# Patient Record
Sex: FEMALE | Race: WHITE | Employment: FULL TIME | URBAN - METROPOLITAN AREA
[De-identification: names, ages, dates, MRNs, and addresses within clinical notes are randomized per-mention and may not be internally consistent; named-entity substitution may affect disease eponyms.]

---
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
## 2024-01-25 ENCOUNTER — LAB ENCOUNTER (OUTPATIENT)
Dept: LAB | Age: 24
End: 2024-01-25
Attending: NURSE PRACTITIONER
Payer: COMMERCIAL

## 2024-01-25 ENCOUNTER — OFFICE VISIT (OUTPATIENT)
Dept: FAMILY MEDICINE CLINIC | Facility: CLINIC | Age: 24
End: 2024-01-25
Payer: COMMERCIAL

## 2024-01-25 VITALS
TEMPERATURE: 97 F | HEART RATE: 68 BPM | WEIGHT: 164 LBS | BODY MASS INDEX: 26.05 KG/M2 | OXYGEN SATURATION: 98 % | DIASTOLIC BLOOD PRESSURE: 76 MMHG | HEIGHT: 66.5 IN | SYSTOLIC BLOOD PRESSURE: 112 MMHG

## 2024-01-25 DIAGNOSIS — Z83.3 FAMILY HISTORY OF DIABETES MELLITUS: ICD-10-CM

## 2024-01-25 DIAGNOSIS — E66.3 OVERWEIGHT (BMI 25.0-29.9): ICD-10-CM

## 2024-01-25 DIAGNOSIS — M54.9 UPPER BACK PAIN: ICD-10-CM

## 2024-01-25 DIAGNOSIS — M54.2 NECK PAIN: ICD-10-CM

## 2024-01-25 DIAGNOSIS — Z82.69 FAMILY HISTORY OF SCOLIOSIS: ICD-10-CM

## 2024-01-25 DIAGNOSIS — Z76.89 ENCOUNTER TO ESTABLISH CARE: Primary | ICD-10-CM

## 2024-01-25 LAB
ALBUMIN SERPL-MCNC: 4.2 G/DL (ref 3.4–5)
ALBUMIN/GLOB SERPL: 1.1 {RATIO} (ref 1–2)
ALP LIVER SERPL-CCNC: 62 U/L
ANION GAP SERPL CALC-SCNC: 3 MMOL/L (ref 0–18)
AST SERPL-CCNC: 19 U/L (ref 15–37)
BASOPHILS # BLD AUTO: 0.05 X10(3) UL (ref 0–0.2)
BASOPHILS NFR BLD AUTO: 0.5 %
BILIRUB SERPL-MCNC: 1 MG/DL (ref 0.1–2)
BUN BLD-MCNC: 11 MG/DL (ref 9–23)
CALCIUM BLD-MCNC: 9.4 MG/DL (ref 8.5–10.1)
CHLORIDE SERPL-SCNC: 111 MMOL/L (ref 98–112)
CO2 SERPL-SCNC: 27 MMOL/L (ref 21–32)
CREAT BLD-MCNC: 0.87 MG/DL
EGFRCR SERPLBLD CKD-EPI 2021: 97 ML/MIN/1.73M2 (ref 60–?)
EOSINOPHIL # BLD AUTO: 0.1 X10(3) UL (ref 0–0.7)
EOSINOPHIL NFR BLD AUTO: 1.1 %
ERYTHROCYTE [DISTWIDTH] IN BLOOD BY AUTOMATED COUNT: 12.9 %
FASTING STATUS PATIENT QL REPORTED: YES
GLOBULIN PLAS-MCNC: 3.8 G/DL (ref 2.8–4.4)
GLUCOSE BLD-MCNC: 82 MG/DL (ref 70–99)
HCT VFR BLD AUTO: 44.4 %
HGB BLD-MCNC: 14.2 G/DL
IMM GRANULOCYTES # BLD AUTO: 0.02 X10(3) UL (ref 0–1)
IMM GRANULOCYTES NFR BLD: 0.2 %
LYMPHOCYTES # BLD AUTO: 2.33 X10(3) UL (ref 1–4)
LYMPHOCYTES NFR BLD AUTO: 25.4 %
MCH RBC QN AUTO: 27.9 PG (ref 26–34)
MCHC RBC AUTO-ENTMCNC: 32 G/DL (ref 31–37)
MCV RBC AUTO: 87.2 FL
MONOCYTES # BLD AUTO: 0.68 X10(3) UL (ref 0.1–1)
MONOCYTES NFR BLD AUTO: 7.4 %
NEUTROPHILS # BLD AUTO: 5.98 X10 (3) UL (ref 1.5–7.7)
NEUTROPHILS # BLD AUTO: 5.98 X10(3) UL (ref 1.5–7.7)
NEUTROPHILS NFR BLD AUTO: 65.4 %
OSMOLALITY SERPL CALC.SUM OF ELEC: 290 MOSM/KG (ref 275–295)
PLATELET # BLD AUTO: 338 10(3)UL (ref 150–450)
POTASSIUM SERPL-SCNC: 4.3 MMOL/L (ref 3.5–5.1)
PROT SERPL-MCNC: 8 G/DL (ref 6.4–8.2)
RBC # BLD AUTO: 5.09 X10(6)UL
SODIUM SERPL-SCNC: 141 MMOL/L (ref 136–145)
T4 FREE SERPL-MCNC: 1 NG/DL (ref 0.8–1.7)
TSI SER-ACNC: 0.65 MIU/ML (ref 0.36–3.74)
WBC # BLD AUTO: 9.2 X10(3) UL (ref 4–11)

## 2024-01-25 PROCEDURE — 85025 COMPLETE CBC W/AUTO DIFF WBC: CPT

## 2024-01-25 PROCEDURE — 36415 COLL VENOUS BLD VENIPUNCTURE: CPT

## 2024-01-25 PROCEDURE — 3078F DIAST BP <80 MM HG: CPT | Performed by: NURSE PRACTITIONER

## 2024-01-25 PROCEDURE — 3074F SYST BP LT 130 MM HG: CPT | Performed by: NURSE PRACTITIONER

## 2024-01-25 PROCEDURE — 84443 ASSAY THYROID STIM HORMONE: CPT

## 2024-01-25 PROCEDURE — 83036 HEMOGLOBIN GLYCOSYLATED A1C: CPT

## 2024-01-25 PROCEDURE — 3008F BODY MASS INDEX DOCD: CPT | Performed by: NURSE PRACTITIONER

## 2024-01-25 PROCEDURE — 84439 ASSAY OF FREE THYROXINE: CPT

## 2024-01-25 PROCEDURE — 99203 OFFICE O/P NEW LOW 30 MIN: CPT | Performed by: NURSE PRACTITIONER

## 2024-01-25 PROCEDURE — 80053 COMPREHEN METABOLIC PANEL: CPT

## 2024-01-25 NOTE — PROGRESS NOTES
HPI:     Patient is here to establish care and for several concerns.    Weight Concerns. Last month she weighed herself  at home last month. She was 170 lb. Used to exercise regularly but not in the last month.   Back and Upper Neck Pain. Started in high school. Feels like she always needs to pop or move her neck. Tries to sit up straight and use extra pillows. Avoids sleeping on her side. No injuries or falls that she can recall. No history of MVA. 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 Had pain prior to that though.    No current outpatient medications on file.      History reviewed. No pertinent past medical history.   History reviewed. No pertinent surgical history.   History reviewed. No pertinent family history.   Social History     Socioeconomic History    Marital status: Single   Tobacco Use    Smoking status: Never    Smokeless tobacco: Never   Vaping Use    Vaping Use: Never used         REVIEW OF SYSTEMS:   Review of Systems   Constitutional:  Positive for unexpected weight change.   Musculoskeletal:  Positive for back pain, neck pain and neck stiffness.       EXAM:   /76   Pulse 68   Temp 97.4 °F (36.3 °C)   Ht 5' 6.5\" (1.689 m)   Wt 164 lb (74.4 kg)   LMP 01/18/2024   SpO2 98%   BMI 26.07 kg/m²   Physical Exam  Constitutional:       General: She is not in acute distress.     Appearance: Normal appearance.   Cardiovascular:      Rate and Rhythm: Normal rate and regular rhythm.      Heart sounds: Normal heart sounds.   Pulmonary:      Effort: Pulmonary effort is normal.      Breath sounds: Normal breath sounds.   Musculoskeletal:      Cervical back: Crepitus present. No spasms or bony tenderness. Pain with movement present. Normal range of motion (tenderness with left lateral extension).        Back:    Neurological:      Mental Status: She is alert.         ASSESSMENT AND PLAN:   Diagnoses and all orders for this visit:    Encounter to establish care    Neck pain  -     XR CERVICAL SPINE  (4VIEWS) (CPT=72050); Future  -     XR SCOLIOSIS SPINE 2-3 VIEWS (CPT=72082); Future  -     Physical Therapy Referral - [facility]    Upper back pain  -     XR CERVICAL SPINE (4VIEWS) (CPT=72050); Future  -     XR SCOLIOSIS SPINE 2-3 VIEWS SH(CPT=72082); Future  -     Physical Therapy Referral - [facility]    Family history of diabetes mellitus  -     Hemoglobin A1C [E]; Future    Family history of scoliosis  -     XR SCOLIOSIS SPINE 2-3 VIEWS (CPT=72082); Future    Overweight (BMI 25.0-29.9)  -     CBC W Differential W Platelet [E]; Future  -     Comp Metabolic Panel (14) [E]; Future  -     TSH and Free T4 [E]; Future  -     Hemoglobin A1C [E]; Future

## 2024-01-26 ENCOUNTER — HOSPITAL ENCOUNTER (OUTPATIENT)
Dept: GENERAL RADIOLOGY | Age: 24
Discharge: HOME | End: 2024-01-26
Attending: NURSE PRACTITIONER
Payer: COMMERCIAL

## 2024-01-26 ENCOUNTER — TELEPHONE (OUTPATIENT)
Dept: FAMILY MEDICINE CLINIC | Facility: CLINIC | Age: 24
End: 2024-01-26

## 2024-01-26 DIAGNOSIS — M54.2 NECK PAIN: ICD-10-CM

## 2024-01-26 DIAGNOSIS — Z82.69 FAMILY HISTORY OF SCOLIOSIS: ICD-10-CM

## 2024-01-26 DIAGNOSIS — M54.9 UPPER BACK PAIN: ICD-10-CM

## 2024-01-26 LAB
EST. AVERAGE GLUCOSE BLD GHB EST-MCNC: 111 MG/DL (ref 68–126)
HBA1C MFR BLD: 5.5 % (ref ?–5.7)

## 2024-01-26 PROCEDURE — 72050 X-RAY EXAM NECK SPINE 4/5VWS: CPT | Performed by: NURSE PRACTITIONER

## 2024-01-26 PROCEDURE — 72082 X-RAY EXAM ENTIRE SPI 2/3 VW: CPT | Performed by: NURSE PRACTITIONER

## 2024-01-26 NOTE — TELEPHONE ENCOUNTER
----- Message from XIANG Santana sent at 1/26/2024 12:58 PM CST -----  Results reviewed. No diabetes

## 2024-01-27 ENCOUNTER — TELEPHONE (OUTPATIENT)
Dept: FAMILY MEDICINE CLINIC | Facility: CLINIC | Age: 24
End: 2024-01-27

## 2024-01-27 DIAGNOSIS — M43.9 CURVATURE OF THORACIC SPINE: Primary | ICD-10-CM

## 2024-01-27 NOTE — TELEPHONE ENCOUNTER
----- Message from XIANG Santana sent at 1/27/2024  8:07 AM CST -----  Results reviewed. Normal neck xray  Start PT

## 2024-01-27 NOTE — TELEPHONE ENCOUNTER
----- Message from XIANG Santana sent at 1/27/2024  8:08 AM CST -----  There is curvature in thoracic and lumbar spine. She is starting PT. I would also recommend seeing Dr. Tran given she is having chronic neck and back pain

## 2024-01-31 ENCOUNTER — OFFICE VISIT (OUTPATIENT)
Dept: OBGYN CLINIC | Facility: CLINIC | Age: 24
End: 2024-01-31
Payer: COMMERCIAL

## 2024-01-31 VITALS
BODY MASS INDEX: 26.11 KG/M2 | DIASTOLIC BLOOD PRESSURE: 72 MMHG | SYSTOLIC BLOOD PRESSURE: 124 MMHG | HEIGHT: 66.5 IN | HEART RATE: 70 BPM | WEIGHT: 164.38 LBS

## 2024-01-31 DIAGNOSIS — Z97.5 NEXPLANON IN PLACE: ICD-10-CM

## 2024-01-31 DIAGNOSIS — Z12.4 SCREENING FOR CERVICAL CANCER: ICD-10-CM

## 2024-01-31 DIAGNOSIS — Z01.419 WELL WOMAN EXAM WITH ROUTINE GYNECOLOGICAL EXAM: Primary | ICD-10-CM

## 2024-01-31 DIAGNOSIS — Z30.09 ENCOUNTER FOR GENERAL COUNSELING AND ADVICE ON CONTRACEPTIVE MANAGEMENT: ICD-10-CM

## 2024-01-31 PROCEDURE — 99395 PREV VISIT EST AGE 18-39: CPT | Performed by: NURSE PRACTITIONER

## 2024-01-31 PROCEDURE — 3074F SYST BP LT 130 MM HG: CPT | Performed by: NURSE PRACTITIONER

## 2024-01-31 PROCEDURE — 3008F BODY MASS INDEX DOCD: CPT | Performed by: NURSE PRACTITIONER

## 2024-01-31 PROCEDURE — 88175 CYTOPATH C/V AUTO FLUID REDO: CPT | Performed by: NURSE PRACTITIONER

## 2024-01-31 PROCEDURE — 3078F DIAST BP <80 MM HG: CPT | Performed by: NURSE PRACTITIONER

## 2024-01-31 NOTE — PROGRESS NOTES
Subjective:  Chief Complaint   Patient presents with    Physical     Annual     24 year old female  presents for ***.    Patient's last menstrual period was 2024 (exact date).  Pap Result Notes: no pap yet  Menarche: 13 (2024  8:28 AM)  Period Cycle (Days): irregular sinc enexplanon insertion (2024  8:28 AM)  Period Duration (Days): 4-6 weeks (2024  8:28 AM)  Period Flow: varies heavy to moderate (2024  8:28 AM)  Use of Birth Control (if yes, specify type): Nexplanon (2024  8:28 AM)  Pap Result Notes: no pap yet (2024  8:28 AM)    Sexually active with male partner  Declines STD testing  Pelvic Infections/STD: None    Denies family history of breast, ovarian and colon CA.    Feeling safe at home.    Most Recent Immunizations   Administered Date(s) Administered    Covid-19 Vaccine Moderna 100 mcg/0.5 ml 08/10/2021    Covid-19 Vaccine Moderna 50 Mcg/0.25 Ml 2022    Tb Intradermal Test 2023      reports that she has never smoked. She has never used smokeless tobacco.   reports current alcohol use of about 3.0 standard drinks of alcohol per week.    History reviewed. No pertinent past medical history.  History reviewed. No pertinent surgical history.    Review of Systems:  Pertinent items are noted in the HPI.    Objective:  /72   Pulse 70   Ht 66.5\"   Wt 164 lb 6 oz (74.6 kg)   LMP 2024 (Exact Date)   BMI 26.13 kg/m²    Physical Examination:  General appearance: Well dressed, well nourished in no apparent distress  Neurologic/Psychiatric: Alert and oriented to person, place and time, mood normal, affect appropriate  Head: Normocephalic without obvious deformity, atraumatic  Neck: No thyromegaly, supple, non-tender, no masses, no adenopathy  Lungs: Clear to auscultation bilaterally, no rales, wheezes or rhonchi  Breasts: Symmetric, non-tender, no masses, lesions, retraction, dimpling or discharge bilaterally, no axillary or supraclavicular  lymphadenopathy  Heart:: Regular rate and rhythm, no gallops or murmurs  Abdomen: Soft, non-tender, non-distended, no masses, no hepatosplenomegaly, no hernias, no inguinal lymphadenopathy  Pelvic:    External genitalia- Normal, Bartholin's, urethra, skeins glands normal   Vagina- No vaginal lesions, ***physiologic discharge   Cervix- No lesions, long/closed, no cervical motion tenderness   Uterus- Normal sized, non-tender, no masses   Adnexa-  Non-tender, no masses  Extremities: Non-tender, full range of motion, no clubbing, cyanosis or edema  Skin:  General inspection- no rashes, lesions or discoloration  ***Genital cultures collected  ***Pap smear done    Assessment/Plan:  ***Normal well-woman exam.  ***STD screen  ***Pap smear obtained.    Patient offered chaperone for exam, declined***    There are no diagnoses linked to this encounter.    No follow-ups on file.

## 2024-01-31 NOTE — PROGRESS NOTES
Subjective:  Chief Complaint   Patient presents with    Physical     Annual     24 year old female  presents for annual.    Patient's last menstrual period was 2024 (exact date).  Pap Result Notes: no pap yet  Menarche: 13 (2024  8:28 AM)  Period Cycle (Days): irregular sinc enexplanon insertion (2024  8:28 AM)  Period Duration (Days): 4-6 weeks (2024  8:28 AM)  Period Flow: varies heavy to moderate (2024  8:28 AM)  Use of Birth Control (if yes, specify type): Nexplanon (2024  8:28 AM)  Pap Result Notes: no pap yet (2024  8:28 AM)    Nexplanon placed 2021  Sexually active with 1 male partner  Declines STD screen    Denies family history of breast, ovarian and colon CA.    Feeling safe at home.    Most Recent Immunizations   Administered Date(s) Administered    Covid-19 Vaccine Moderna 100 mcg/0.5 ml 08/10/2021    Covid-19 Vaccine Moderna 50 Mcg/0.25 Ml 2022    Tb Intradermal Test 2023      reports that she has never smoked. She has never used smokeless tobacco.   reports current alcohol use of about 3.0 standard drinks of alcohol per week.    History reviewed. No pertinent past medical history.  History reviewed. No pertinent surgical history.    Review of Systems:  Pertinent items are noted in the HPI.    Objective:  /72   Pulse 70   Ht 66.5\"   Wt 164 lb 6 oz (74.6 kg)   LMP 2024 (Exact Date)   BMI 26.13 kg/m²    Physical Examination:  General appearance: Well dressed, well nourished in no apparent distress  Neurologic/Psychiatric: Alert and oriented to person, place and time, mood normal, affect appropriate  Head: Normocephalic without obvious deformity, atraumatic  Neck: No thyromegaly, supple, non-tender, no masses, no adenopathy  Lungs: Clear to auscultation bilaterally, no rales, wheezes or rhonchi  Breasts: Symmetric, non-tender, no masses, lesions, retraction, dimpling or discharge bilaterally, no axillary or supraclavicular  lymphadenopathy  Heart: Regular rate and rhythm, no gallops or murmurs  Abdomen: Soft, non-tender, non-distended, no masses, no hepatosplenomegaly, no hernias, no inguinal lymphadenopathy  Pelvic:    External genitalia- Normal, Bartholin's, urethra, skeins glands normal   Vagina- No vaginal lesions, physiologic discharge   Cervix- No lesions, long/closed, no cervical motion tenderness   Uterus- Normal sized, non-tender, no masses   Adnexa-  Non-tender, no masses  Extremities: + implant palpated LUE, Non-tender, full range of motion, no clubbing, cyanosis or edema  Skin:  General inspection- no rashes, lesions or discoloration  Pap smear done    Assessment/Plan:  Normal well-woman exam.  Declines STD screen  Pap smear obtained.    Patient offered chaperone for exam, declined    Diagnoses and all orders for this visit:    Well woman exam with routine gynecological exam  - self breast exam discussed and encouraged    Screening for cervical cancer  -     ThinPrep PAP with HPV Reflex Request B; Future    Nexplanon in place  - Implant palpated  - insertion 9/2021  - remove on or before 9/2024    Encounter for general counseling and advice on contraceptive management  - discussed contraceptive options including IUD, implant, pill, patch, vaginal ring, injection   - pt will consider for after removal of Nexplanon      Return in about 1 year (around 1/31/2025) for annual well woman exam or sooner if needed.

## 2024-02-05 LAB
.: NORMAL
.: NORMAL

## 2024-04-15 ENCOUNTER — TELEPHONE (OUTPATIENT)
Dept: ORTHOPEDICS CLINIC | Facility: CLINIC | Age: 24
End: 2024-04-15

## 2024-04-15 NOTE — TELEPHONE ENCOUNTER
Patient is coming in for upper back into neck pain.    XRAY'S in epic from January. Please advise if anything additional is needed

## 2024-04-22 ENCOUNTER — PATIENT MESSAGE (OUTPATIENT)
Dept: FAMILY MEDICINE CLINIC | Facility: CLINIC | Age: 24
End: 2024-04-22

## 2024-04-23 ENCOUNTER — OFFICE VISIT (OUTPATIENT)
Dept: ORTHOPEDICS CLINIC | Facility: CLINIC | Age: 24
End: 2024-04-23
Payer: COMMERCIAL

## 2024-04-23 VITALS — WEIGHT: 166 LBS | BODY MASS INDEX: 26.06 KG/M2 | HEIGHT: 67 IN

## 2024-04-23 DIAGNOSIS — M41.125 ADOLESCENT IDIOPATHIC SCOLIOSIS OF THORACOLUMBAR REGION: Primary | ICD-10-CM

## 2024-04-23 PROCEDURE — 99204 OFFICE O/P NEW MOD 45 MIN: CPT | Performed by: STUDENT IN AN ORGANIZED HEALTH CARE EDUCATION/TRAINING PROGRAM

## 2024-04-23 NOTE — H&P
[facility] - ORTHOPEDICS  [address], [address] [address]  [address] [place] [id number]  [phone]     NEW PATIENT VISIT - HISTORY AND PHYSICAL EXAMINATION     Name: Aidee Ross   MRN: [id number]  Date: 04/23/24       CC: neck pain, scoliosis    REFERRED BY: Mirta Alexander MD    HPI:   Aidee Ross is a very pleasant 24 year old female who presents today for evaluation of neck pain. The distribution of symptoms are: 100% neck pain and 0% arm pain. The symptoms began many year(s) ago without any significant injury. Since the onset, the symptoms have remained the same. The patient reports no significant numbness and no weakness.  The symptom characteristics are as follows: Patient is a 24-year-old female presenting with predominantly neck pain that has been low to moderate and constantly achy since high school.  Patient has not had formal treatment.  Patient also had a x-ray that demonstrated mild scoliosis.  No neurologic symptoms.     Prior spine surgery: none.    Bowel and bladder symptoms: absent.    The patient has not had issues with balance and/or hand dexterity problems such as changes in penmanship or the use of buttons or zippers.    Treatment up to this time has included:    Evaluation: PCP  NSAIDS: have not been helpful  Narcotic use: None  Physical therapy: None  Spinal injections: None      PMH:   History reviewed. No pertinent past medical history.    PAST SURGICAL HX:  History reviewed. No pertinent surgical history.    FAMILY HX:  Family History   Problem Relation Age of Onset    Diabetes Father     Musculo-skelatal Disorder Mother         scoliosis    Musculo-skelatal Disorder Maternal Grandmother         scoliosis    Diabetes Paternal Grandfather     Diabetes Half-Brother        ALLERGIES:  Patient has no known allergies.    MEDICATIONS:   No current outpatient medications on file.       ROS: A comprehensive 14 point review of  systems was performed and was negative aside from the aforementioned per history of present illness.    SOCIAL HX:  Social History     Tobacco Use    Smoking status: Never    Smokeless tobacco: Never   Substance Use Topics    Alcohol use: Yes     Alcohol/week: 3.0 standard drinks of alcohol     Types: 3 Glasses of wine per week     Comment: a week         PE:   Vitals:    04/23/24 1418   Weight: 166 lb (75.3 kg)   Height: 5' 7\" (1.702 m)     Estimated body mass index is 26 kg/m² as calculated from the following:    Height as of this encounter: 5' 7\" (1.702 m).    Weight as of this encounter: 166 lb (75.3 kg).    Physical Exam  Constitutional:       Appearance: Normal appearance.   HENT:      Head: Normocephalic and atraumatic.   Eyes:      Extraocular Movements: Extraocular movements intact.   Cardiovascular:      Pulses: Normal pulses. Skin warm and well perfused.  Pulmonary:      Effort: Pulmonary effort is normal. No respiratory distress.   Skin:     General: Skin is warm.   Psychiatric:         Mood and Affect: Mood normal.     Spine Exam:    Spine Exam:    Normal gait without difficulty  Able to heel, toe, tandem gait without difficulty  Level shoulders and hips in even stance    Normal C-spine ROM  Normal L-spine ROM    No tenderness to palpation of C-spine  No tenderness to palpation of T-spine  No tenderness to palpation of L-spine    Spluring: negative  Straight leg raise test: negative    Ramírez's: negative  IRR: negative  Sustained clonus: negative     and release: normal  Rhomberg: normal    UE Strength: 5/5 D Bi Tri WE FDS IO  UE Sensation: normal in C5-T1 distribution  UE reflexes: normal    LE Strength: 5/5 IP QUAD EHL FHL TA GSC  LE Sensation: normal in L2-S1 distribution  LE reflexes: normal      Radiographic Examination/Diagnostics:  XR personally viewed, independently interpreted and radiology report was reviewed.  X-ray of the cervical spine demonstrates no acute pathology minimal  degenerative changes  X-ray of the thoracic spine demonstrates mild thoracic scoliosis with lumbar compensation.  Overall spine as well      IMPRESSION: Aidee Ross is a 24 year old female with neck pain as well as likely adolescent idiopathic scoliosis.  No significant risk of curve progression at this time.    PLAN:     We reviewed the patients history, symptoms, exam findings, and imaging today.  We had a detailed discussion outlining the etiology, anatomy, pathophysiology, and natural history of neck pain and scoliosis. The typical management of this condition may include lifestyle modification, NSAIDs, physical therapy,  - Recommend formal physical therapy for neck and thoracic spine    FOLLOW-UP:  We will see her back in follow-up in 6 weeks, or sooner if any problems arise. Patient understands and agrees with plan.       Norman Tran MD  Orthopedic Spine Surgeon  [facility]   [address] [place] [address], [address].[facility]  [email or web address]  t: [phone]   f: [phone]        This note was dictated using Dragon software.  While it was briefly proofread prior to completion, some grammatical, spelling, and word choice errors due to dictation may still occur.

## 2024-04-24 ENCOUNTER — OFFICE VISIT (OUTPATIENT)
Dept: FAMILY MEDICINE CLINIC | Facility: CLINIC | Age: 24
End: 2024-04-24
Payer: COMMERCIAL

## 2024-04-24 VITALS
BODY MASS INDEX: 26 KG/M2 | OXYGEN SATURATION: 99 % | WEIGHT: 167 LBS | SYSTOLIC BLOOD PRESSURE: 116 MMHG | HEART RATE: 76 BPM | DIASTOLIC BLOOD PRESSURE: 70 MMHG | TEMPERATURE: 98 F

## 2024-04-24 DIAGNOSIS — R41.89 BRAIN FOG: ICD-10-CM

## 2024-04-24 DIAGNOSIS — R41.840 DIFFICULTY CONCENTRATING: Primary | ICD-10-CM

## 2024-04-24 PROCEDURE — 99213 OFFICE O/P EST LOW 20 MIN: CPT | Performed by: NURSE PRACTITIONER

## 2024-04-24 NOTE — PROGRESS NOTES
HPI:     Patient is here to discuss possible ADHD. Struggling to stay on task and complete tasks. No previous work up for attention issus. Did well in elementary school but started having grades slip in middle school and high school. Worsened when her parents . Also struggling with emotional regulation. Lack of motivation at home. Sleeping overall okay but does occasional wake up. Not from anxiety. Working part time as . Will be establishing with therapist soon.    No current outpatient medications on file.      History reviewed. No pertinent past medical history.   History reviewed. No pertinent surgical history.   Family History   Problem Relation Age of Onset    Diabetes Father     Musculo-skelatal Disorder Mother         scoliosis    Musculo-skelatal Disorder Maternal Grandmother         scoliosis    Diabetes Paternal Grandfather     Diabetes Half-Brother       Social History     Socioeconomic History    Marital status: Single   Tobacco Use    Smoking status: Never    Smokeless tobacco: Never   Vaping Use    Vaping status: Some Days    Substances: THC, CBD    Devices: Pre-filled or refillable cartridge   Substance and Sexual Activity    Alcohol use: Yes     Alcohol/week: 3.0 standard drinks of alcohol     Types: 3 Glasses of wine per week     Comment: a week    Drug use: Yes     Types: Cannabis     Comment: occasionally    Sexual activity: Yes     Partners: Male     Birth control/protection: Implant   Other Topics Concern    Caffeine Concern Yes    Exercise Yes    Seat Belt Yes         REVIEW OF SYSTEMS:   Review of Systems   Constitutional:  Negative for appetite change.   Respiratory:  Negative for cough.    Cardiovascular:  Negative for chest pain.   Psychiatric/Behavioral:  Positive for confusion (sometimes) and decreased concentration. Negative for dysphoric mood, self-injury, sleep disturbance and suicidal ideas. The patient is nervous/anxious. The patient is not hyperactive.         EXAM:   /70   Pulse 76   Temp 97.6 °F (36.4 °C)   Wt 167 lb (75.8 kg)   LMP 04/22/2024 (Exact Date)   SpO2 99%   BMI 26.16 kg/m²   Physical Exam  Constitutional:       General: She is not in acute distress.     Appearance: Normal appearance.   Cardiovascular:      Rate and Rhythm: Normal rate.      Heart sounds: Normal heart sounds.   Pulmonary:      Effort: Pulmonary effort is normal.      Breath sounds: Normal breath sounds.   Neurological:      Mental Status: She is alert.   Psychiatric:         Attention and Perception: Attention normal.         Mood and Affect: Affect is tearful.         Speech: Speech normal.         ASSESSMENT AND PLAN:   Diagnoses and all orders for this visit:    Difficulty concentrating  -     Psychiatry Referral - External    Brain fog  -     Psychiatry Referral - External    Referral for neuropsych testing  Await testing prior to starting any medications

## 2024-08-15 ENCOUNTER — OFFICE VISIT (OUTPATIENT)
Dept: OBGYN CLINIC | Facility: CLINIC | Age: 24
End: 2024-08-15
Payer: COMMERCIAL

## 2024-08-15 VITALS
WEIGHT: 163.13 LBS | HEART RATE: 72 BPM | BODY MASS INDEX: 25.6 KG/M2 | HEIGHT: 67 IN | SYSTOLIC BLOOD PRESSURE: 112 MMHG | DIASTOLIC BLOOD PRESSURE: 70 MMHG

## 2024-08-15 DIAGNOSIS — Z30.09 ENCOUNTER FOR GENERAL COUNSELING AND ADVICE ON CONTRACEPTIVE MANAGEMENT: ICD-10-CM

## 2024-08-15 DIAGNOSIS — Z30.46 ENCOUNTER FOR REMOVAL OF SUBDERMAL CONTRACEPTIVE IMPLANT: Primary | ICD-10-CM

## 2024-08-15 PROCEDURE — 11982 REMOVE DRUG IMPLANT DEVICE: CPT | Performed by: NURSE PRACTITIONER

## 2024-08-15 NOTE — PROCEDURES
Nexplanon Removal Procedure Note    Post-Operative Diagnosis and Indication: Desires removal    Procedure Details:   The risks including infection, scarring, bleeding and difficulty with removal were explained to the patient and verbal informed consent obtained.    Procedure performed under sterile conditions with betadine prep.  Incision site marked at distal tip of implant on left arm and area infiltrated with 1% lidocaine solution.  Small stab incision made and nexplanon device removed intact without difficulty using alan clamp.  Steri strips, adhesive bandage and wrap bandage applied.    Condition:  Stable    Complications:  None    Plan:  The patient was advised to call for any fever, redness, bruising, discharge or worsening pain.  Follow up as needed or for routine gynecologic examination.

## 2024-08-15 NOTE — PROGRESS NOTES
Subjective:  24 year old    Chief Complaint   Patient presents with    Procedure     Nexplanon removal     Pt here today requesting removal of Nexplanon    Review of Systems:  Pertinent items are noted in the HPI.    Objective:  /70   Pulse 72   Ht 67\"   Wt 163 lb 2 oz (74 kg)   LMP 2024 (Exact Date)     Physical Examination:  General appearance: Well dressed, well nourished in no apparent distress  Neurologic/Psychiatric: Alert and oriented to person, place and time, mood normal, affect appropriate  LUE: implant palpated    Assessment/Plan:      Diagnoses and all orders for this visit:    Encounter for removal of subdermal contraceptive implant  -     Nexplanon Removal Only [10739] - see procedure note    Encounter for general counseling and advice on contraceptive management  - we discussed OCP vs injection  - denies personal/family history of VTE, denies migraine with aura, denies smoking  - pt would like to check with her insurance on depo coverage      Return in about 5 months (around 1/15/2025) for well woman exam or sooner if needed.

## 2024-09-12 ENCOUNTER — TELEPHONE (OUTPATIENT)
Dept: OBGYN CLINIC | Facility: CLINIC | Age: 24
End: 2024-09-12

## 2024-09-12 DIAGNOSIS — Z32.00 ENCOUNTER FOR PREGNANCY TEST, RESULT UNKNOWN: Primary | ICD-10-CM

## 2024-09-12 NOTE — TELEPHONE ENCOUNTER
Discussed with Claudia Jackson.   Patient can schedule insertion with office, up to date on annual, must be on her cycle.

## 2024-09-12 NOTE — TELEPHONE ENCOUNTER
Patient states it is difficult to tell when her next cycle will begin as she hasn't had one since the removal of her last nexplanon. Patient scheduled next available to save spot, however please advise if any testing would need to be done prior to insert if patient does not get cycle by appointment.  Future Appointments   Date Time Provider Department Center   10/17/2024 11:00 AM Claudai Jackson APRN [facility] [place]

## 2024-09-12 NOTE — TELEPHONE ENCOUNTER
Pt had nexplanon removed 8/15 and now would like a new one placed. Would an office visit be needed before a new nexplanon would be placed? Thank you

## 2024-09-12 NOTE — TELEPHONE ENCOUNTER
HCG order placed per discussion with Claudia Jackson.   Patient can abstain from intercourse for 2 weeks leading up to placement and complete HCG testing the day before appointment.     Called to follow up with patient.   Instructions provided, all questions answered.

## 2024-10-16 ENCOUNTER — LAB ENCOUNTER (OUTPATIENT)
Dept: LAB | Age: 24
End: 2024-10-16
Attending: NURSE PRACTITIONER
Payer: COMMERCIAL

## 2024-10-16 DIAGNOSIS — Z32.00 ENCOUNTER FOR PREGNANCY TEST, RESULT UNKNOWN: ICD-10-CM

## 2024-10-16 LAB — B-HCG SERPL-ACNC: <2.6 MIU/ML

## 2024-10-16 PROCEDURE — 84702 CHORIONIC GONADOTROPIN TEST: CPT

## 2024-10-16 PROCEDURE — 36415 COLL VENOUS BLD VENIPUNCTURE: CPT

## 2024-10-17 ENCOUNTER — OFFICE VISIT (OUTPATIENT)
Dept: OBGYN CLINIC | Facility: CLINIC | Age: 24
End: 2024-10-17
Payer: COMMERCIAL

## 2024-10-17 VITALS
WEIGHT: 162.13 LBS | HEIGHT: 67 IN | SYSTOLIC BLOOD PRESSURE: 112 MMHG | HEART RATE: 78 BPM | DIASTOLIC BLOOD PRESSURE: 68 MMHG | BODY MASS INDEX: 25.45 KG/M2

## 2024-10-17 DIAGNOSIS — Z01.812 PRE-PROCEDURAL LABORATORY EXAMINATION: ICD-10-CM

## 2024-10-17 DIAGNOSIS — Z30.017 INSERTION OF IMPLANTABLE SUBDERMAL CONTRACEPTIVE: Primary | ICD-10-CM

## 2024-10-17 LAB
CONTROL LINE PRESENT WITH A CLEAR BACKGROUND (YES/NO): YES YES/NO
KIT LOT #: NORMAL NUMERIC
PREGNANCY TEST, URINE: NEGATIVE

## 2024-10-17 PROCEDURE — 11981 INSERTION DRUG DLVR IMPLANT: CPT | Performed by: NURSE PRACTITIONER

## 2024-10-17 PROCEDURE — 81025 URINE PREGNANCY TEST: CPT | Performed by: NURSE PRACTITIONER

## 2024-10-17 NOTE — PROGRESS NOTES
Subjective:  24 year old    Chief Complaint   Patient presents with    Procedure     Nexplanon insertion     Pt here today requesting Nexplanon insertion    Review of Systems:  Pertinent items are noted in the HPI.    Objective:  /68   Pulse 78   Ht 67\"   Wt 162 lb 2 oz (73.5 kg)   LMP 10/09/2024 (Approximate)     Physical Examination:  General appearance: Well dressed, well nourished in no apparent distress  Neurologic/Psychiatric: Alert and oriented to person, place and time, mood normal, affect appropriate      Assessment/Plan:      Diagnoses and all orders for this visit:    Insertion of implantable subdermal contraceptive  -     Etonogestrel IMPL 1 each  -     Nexplanon Insertion [53038]    Pre-procedural laboratory examination  -     Urine Preg Test [54296]        Return for annual well woman exam.

## 2024-10-17 NOTE — PATIENT INSTRUCTIONS
[facility] of [facility]  After Care Instructions for Nexplanon       Bleeding    You may experience irregular bleeding for the first 3-6 months.   If your bleeding becomes heavier than a normal menstrual cycle, please contact our office.        Pain   You may experience mild pain to the arm typically lasting 24-48hrs.  You may use Ibuprofen, Aleve and Tylenol to relieve the discomfort.   If you experience severe or persistent pain contact our office.      Restrictions    A bandage was placed on your arm to cover the site where the Nexplanon was inserted.  Leave the larger bandage on for 12 hours and keep the smaller bandage clean, dry, and in place for 24-48 hours.      When to contact our office   If you are experiencing discomfort described as worse than sore pain to your arm that's not relieved after taking Ibuprofen.  Fever of 100.4 or greater with increase swelling or redness to your arm.  Vaginal bleeding that is saturating 1 pad per hour.      If you have additional questions or concerns, please call us at [phone].

## 2024-10-17 NOTE — PROCEDURES
Nexplanon Insertion Procedure Note    Post-Operative Diagnosis and Indication: Desires contraception    Procedure Details:   Urine pregnancy test negative.  LMP 10/9/2024.  Consent signed for Nexplanon. The risks including infection, scarring, bleeding, difficulty with removal, migration, side effects and failure rate were explained to the patient and informed consent obtained.    Procedure performed under sterile conditions with betadine prep.  The patient reported she was right hand dominant. Therefore, decision was made to place the Nexplanon implant on the patient's left arm. Incision site marked 8 cm proximal to the medial epicondyle of the upper left arm, 3 cm posterior to the sulcus between the biceps and triceps muscle, with second arnold 5 cm proximal to insertion site, and the insertion site infiltrated with 1% lidocaine solution.  The Nexplanon applicator was then removed from the package. The transparent cap was removed and the applicator was examined to confirm the presence of the Nexplanon device. The Nexplanon applicator was then brought towards the incision and inserted with the tip of the needle at about a 30 degree angle. The Nexplanon applicator was then lowered to a horizontal position and slowly advanced proximal to the insertion site until the full length of needle was noted to be just under the skin. The Nexplanon applicator was then deployed and subsequently removed.  Implant palpated in correct subdermal space immediately after insertion.  Steri-strips, bandaid and wrap bandage applied.  The patient was instructed to keep the pressure bandage in place for 24 hours. The patient tolerated the procedure well. The patient was provided with the Nexplanon user card. She was counseled on the recommendation for back up method of contraception for 7 days after placement. The patient verbalized understanding.     Condition:  Stable    Complications:  None    Plan:  The patient was advised to call for  any fever, redness, bruising, discharge or worsening pain.  Follow up as needed or for routine gynecologic examination.

## 2024-10-30 ENCOUNTER — TELEPHONE (OUTPATIENT)
Dept: OBGYN CLINIC | Facility: CLINIC | Age: 24
End: 2024-10-30

## 2024-10-30 NOTE — TELEPHONE ENCOUNTER
Rec'd Medical Records request from [facility] requesting medical records to determine eligible expenses. Sent  from to MR dept.

## 2024-12-05 ENCOUNTER — OFFICE VISIT (OUTPATIENT)
Dept: FAMILY MEDICINE CLINIC | Facility: CLINIC | Age: 24
End: 2024-12-05
Payer: COMMERCIAL

## 2024-12-05 VITALS
WEIGHT: 165 LBS | BODY MASS INDEX: 25.59 KG/M2 | DIASTOLIC BLOOD PRESSURE: 76 MMHG | TEMPERATURE: 98 F | OXYGEN SATURATION: 98 % | HEART RATE: 62 BPM | HEIGHT: 67.5 IN | SYSTOLIC BLOOD PRESSURE: 120 MMHG

## 2024-12-05 DIAGNOSIS — F32.A ANXIETY AND DEPRESSION: ICD-10-CM

## 2024-12-05 DIAGNOSIS — R41.89 BRAIN FOG: ICD-10-CM

## 2024-12-05 DIAGNOSIS — F41.9 ANXIETY AND DEPRESSION: ICD-10-CM

## 2024-12-05 DIAGNOSIS — R41.840 DIFFICULTY CONCENTRATING: ICD-10-CM

## 2024-12-05 DIAGNOSIS — I45.4 BBB (BUNDLE BRANCH BLOCK): ICD-10-CM

## 2024-12-05 DIAGNOSIS — Z00.00 GENERAL MEDICAL EXAM: Primary | ICD-10-CM

## 2024-12-05 LAB
ATRIAL RATE: 71 BPM
P AXIS: 49 DEGREES
P-R INTERVAL: 154 MS
Q-T INTERVAL: 392 MS
QRS DURATION: 96 MS
QTC CALCULATION (BEZET): 425 MS
R AXIS: 13 DEGREES
T AXIS: 26 DEGREES
VENTRICULAR RATE: 71 BPM

## 2024-12-05 PROCEDURE — 99213 OFFICE O/P EST LOW 20 MIN: CPT | Performed by: NURSE PRACTITIONER

## 2024-12-05 PROCEDURE — 93000 ELECTROCARDIOGRAM COMPLETE: CPT | Performed by: NURSE PRACTITIONER

## 2024-12-05 PROCEDURE — 99395 PREV VISIT EST AGE 18-39: CPT | Performed by: NURSE PRACTITIONER

## 2024-12-05 RX ORDER — DEXTROAMPHETAMINE SACCHARATE, AMPHETAMINE ASPARTATE MONOHYDRATE, DEXTROAMPHETAMINE SULFATE AND AMPHETAMINE SULFATE 2.5; 2.5; 2.5; 2.5 MG/1; MG/1; MG/1; MG/1
10 CAPSULE, EXTENDED RELEASE ORAL EVERY MORNING
Qty: 30 CAPSULE | Refills: 0 | Status: SHIPPED | OUTPATIENT
Start: 2024-12-05 | End: 2025-01-04

## 2024-12-05 NOTE — PROGRESS NOTES
HPI:   Patient is here for physical.    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     AST (U/L)   Date Value   01/25/2024 19     ALT (no units)   Date Value   01/25/2024      Comment:     Due to  backorder we are temporarily unable to offer hospital-based ALT testing at [facility].   If urgently needed, please order ALT test code [id number].   The new order will need a new venipuncture and will be sent to [facility] for testing.   The expected turnaround time will be within 24 hours.         Current Outpatient Medications   Medication Sig Dispense Refill    Etonogestrel (NEXPLANON SC) Inject into the skin.      amphetamine-dextroamphetamine ER (ADDERALL XR) 10 MG Oral Capsule SR 24 Hr Take 1 capsule (10 mg total) by mouth every morning. 30 capsule 0      No past medical history on file.   No past surgical history on file.   Family History   Problem Relation Age of Onset    Diabetes Father     Musculo-skelatal Disorder Mother         scoliosis    Musculo-skelatal Disorder Maternal Grandmother         scoliosis    Diabetes Paternal Grandfather     Diabetes Half-Brother       Social History:   Social History     Socioeconomic History    Marital status: Single   Tobacco Use    Smoking status: Never    Smokeless tobacco: Never   Vaping Use    Vaping status: Former    Substances: THC, CBD    Devices: Pre-filled or refillable cartridge   Substance and Sexual Activity     Alcohol use: Yes     Alcohol/week: 3.0 standard drinks of alcohol     Types: 3 Glasses of wine per week     Comment: a week    Drug use: Yes     Types: Cannabis     Comment: occasionally    Sexual activity: Yes     Partners: Male   Other Topics Concern    Caffeine Concern Yes    Exercise Yes    Seat Belt Yes        REVIEW OF SYSTEMS:   Review of Systems   Constitutional:  Negative for chills, fatigue, fever and unexpected weight change.   HENT:  Positive for congestion (occasionally in the morning, cinnamon helps). Negative for ear pain, postnasal drip, rhinorrhea, sore throat and trouble swallowing.    Eyes:  Negative for visual disturbance.   Respiratory:  Negative for cough and shortness of breath.    Cardiovascular:  Positive for chest pain (very rarely, random, heart burn related. not as often as it used). Negative for palpitations.   Gastrointestinal:  Negative for abdominal pain, blood in stool, constipation, diarrhea, nausea and vomiting.   Endocrine: Negative for cold intolerance, heat intolerance, polydipsia, polyphagia and polyuria.   Genitourinary:  Negative for dysuria, frequency, hematuria and pelvic pain.   Musculoskeletal:  Negative for back pain.   Skin:  Negative for rash.   Neurological:  Negative for headaches.   Hematological:  Does not bruise/bleed easily.   Psychiatric/Behavioral:  Positive for decreased concentration, dysphoric mood and sleep disturbance. Negative for suicidal ideas. The patient is nervous/anxious.        EXAM:   /76   Pulse 62   Temp 98 °F (36.7 °C)   Ht 5' 7.5\" (1.715 m)   Wt 165 lb (74.8 kg)   LMP 10/09/2024 (Approximate)   SpO2 98%   BMI 25.46 kg/m²   Ideal body weight: 62.7 kg (138 lb 5.4 oz)  Adjusted ideal body weight: 67.6 kg (149 lb 0.1 oz)   Physical Exam  Constitutional:       General: She is not in acute distress.     Appearance: She is well-developed, well-groomed and normal weight. She is not ill-appearing.   HENT:      Right Ear: Tympanic membrane,  ear canal and external ear normal.      Left Ear: Tympanic membrane, ear canal and external ear normal.      Nose: Nose normal.      Mouth/Throat:      Mouth: Mucous membranes are moist.      Pharynx: Oropharynx is clear.   Eyes:      Conjunctiva/sclera: Conjunctivae normal.      Pupils: Pupils are equal, round, and reactive to light.   Neck:      Thyroid: No thyromegaly.   Cardiovascular:      Rate and Rhythm: Normal rate and regular rhythm.      Heart sounds: Normal heart sounds.   Pulmonary:      Effort: Pulmonary effort is normal.      Breath sounds: Normal breath sounds.   Abdominal:      General: Abdomen is flat. Bowel sounds are normal.      Palpations: Abdomen is soft.      Tenderness: There is no abdominal tenderness.   Musculoskeletal:         General: Normal range of motion.      Cervical back: Normal range of motion.   Skin:     General: Skin is warm and dry.   Neurological:      General: No focal deficit present.      Mental Status: She is alert and oriented to person, place, and time.      Cranial Nerves: No cranial nerve deficit.   Psychiatric:         Attention and Perception: Attention normal.         Mood and Affect: Mood is depressed. Affect is tearful.         Speech: Speech normal.         Behavior: Behavior normal. Behavior is cooperative.         Thought Content: Thought content normal.         ASSESSMENT AND PLAN:   Diagnoses and all orders for this visit:    General medical exam    Difficulty concentrating  -     [facility] Referral - In Network  -     amphetamine-dextroamphetamine ER (ADDERALL XR) 10 MG Oral Capsule SR 24 Hr; Take 1 capsule (10 mg total) by mouth every morning.  -     EKG with interpretation and Report -IN OFFICE [[id number]]    Brain fog  -     [facility] Referral - In Network  -     amphetamine-dextroamphetamine ER (ADDERALL XR) 10 MG Oral Capsule SR 24 Hr; Take 1 capsule (10 mg total) by mouth every morning.  -     EKG with interpretation and Report -IN OFFICE [[id number]]    Anxiety  and depression  -     [facility] Referral - In Network    Psych referral placed  EKG NSR, no acute changes. Incomplete BBB, will check Echo.  Recommend starting counseling. Can also initiate ADHD treatment in meantime.

## 2024-12-31 DIAGNOSIS — R41.840 DIFFICULTY CONCENTRATING: ICD-10-CM

## 2024-12-31 DIAGNOSIS — R41.89 BRAIN FOG: ICD-10-CM

## 2024-12-31 NOTE — TELEPHONE ENCOUNTER
Last office visit on 12/5/2024 for general medical exam    Last refill on 12/5/2024 - quantity 30

## 2025-01-02 RX ORDER — DEXTROAMPHETAMINE SACCHARATE, AMPHETAMINE ASPARTATE MONOHYDRATE, DEXTROAMPHETAMINE SULFATE AND AMPHETAMINE SULFATE 2.5; 2.5; 2.5; 2.5 MG/1; MG/1; MG/1; MG/1
10 CAPSULE, EXTENDED RELEASE ORAL EVERY MORNING
Qty: 30 CAPSULE | Refills: 0 | Status: SHIPPED | OUTPATIENT
Start: 2025-01-04 | End: 2025-02-03

## 2025-01-04 ENCOUNTER — TELEPHONE (OUTPATIENT)
Dept: FAMILY MEDICINE CLINIC | Facility: CLINIC | Age: 25
End: 2025-01-04

## 2025-01-04 NOTE — TELEPHONE ENCOUNTER
Echo due  Mychart message sent  Future Appointments   Date Time Provider Department Center   1/7/2025  9:00 AM Glotzbach, Tracee, LCPC [facility] [place]   1/10/2025 10:00 AM [id number]

## 2025-01-10 ENCOUNTER — OFFICE VISIT (OUTPATIENT)
Dept: PHYSICAL MEDICINE AND REHAB | Facility: CLINIC | Age: 25
End: 2025-01-10
Payer: COMMERCIAL

## 2025-01-10 VITALS
HEIGHT: 67 IN | DIASTOLIC BLOOD PRESSURE: 72 MMHG | SYSTOLIC BLOOD PRESSURE: 116 MMHG | BODY MASS INDEX: 25.11 KG/M2 | WEIGHT: 160 LBS

## 2025-01-10 DIAGNOSIS — M41.20 IDIOPATHIC SCOLIOSIS IN ADULT PATIENT: ICD-10-CM

## 2025-01-10 DIAGNOSIS — M79.18 CERVICAL MYOFASCIAL PAIN SYNDROME: Primary | ICD-10-CM

## 2025-01-10 RX ORDER — MELOXICAM 15 MG/1
TABLET ORAL
Qty: 30 TABLET | Refills: 0 | Status: SHIPPED | OUTPATIENT
Start: 2025-01-10

## 2025-01-10 NOTE — H&P
History and Physical    C/C:   Chief Complaint   Patient presents with    Neck Pain     New right handed patient, referred by XIANG Cobb, comes in with bilateral neck and upper back aching pain. Denies T/N. Admits R shoulder weakness. Rates pain 2/10. XR of C-spine done last year. Denies PT. Symptoms began 5 years ago, denies injury. Denies injections. OTC pain meds PRN.     HPI: 24-year-old female with history of ADHD presents with chronic bilateral neck pain that has been present for years without inciting event. Has been present since teenage years. Takes tylenol and ibuprofen as needed. No physical therapy.  No radiation to the bilateral upper extremities, no associated numbness or tingling.  No weakness in the arms or hands.  No abnormal balance.  No no physical therapy.  No history of cervical spine injections or surgeries.  She has had x-rays of the cervical spine as well as lumbar scoliosis views.  No MRI.    Mother has scoliosis; grandmother also has scoliosis.     Allergies: Allergies[1]     Pertinent review of systems: Denies fever, chills, unintended weight loss, night sweats     Physical exam:  /72   Ht 67\"   Wt 160 lb (72.6 kg)   LMP 10/09/2024 (Approximate)   BMI 25.06 kg/m²     Cervical spine exam:    No neck rash  No ttp cervical paraspinals bilaterally. No ttp upper trapezius bilaterally  Cervical extension 50 degrees. Cervical flexion 50 degrees. Cervical rotation to the right 80 degrees. Cervical rotation to the left 80 degrees   Spurling's test negative bilaterally  5/5 strength in shoulder abduction, elbow flexion, elbow extension, wrist extension, finger flexion, FDI bilaterally  SILT b/l UE C5-T1 distributions  2/4 biceps, brachioradialis, triceps reflexes b/l  Ramírez test negative    Imaging: X-rays cervical spine dated 1/26/2024 independently reviewed, as was reports.  Some enlargement of the transverse processes of C6 and C7 bilaterally the x-rays are otherwise  normal.    She has a slight S shaped scoliotic curve of the thoracolumbar spine.     Assessment and plan:  1) cervical myofascial pain  2) idiopathic adolescent scoliosis    Recommend meloxicam 15mg qDaily x14 days, then 15 mg daily on an as-needed basis.  Take with food, stop if gives side effects.  I also recommend physical therapy for cervical stabilization and daily at [address] [place] [address] location with a scoliosis PT specialist.    F/u in 6-8 weeks PRN.     Jonny Hare DO  Physical Medicine and Rehabilitation  [facility]       [1] No Known Allergies

## 2025-01-10 NOTE — PATIENT INSTRUCTIONS
Recommend you ideally do physical therapy with Macy at the [facility] location.

## 2025-01-17 ENCOUNTER — OFFICE VISIT (OUTPATIENT)
Dept: FAMILY MEDICINE CLINIC | Facility: CLINIC | Age: 25
End: 2025-01-17
Payer: COMMERCIAL

## 2025-01-17 VITALS
TEMPERATURE: 98 F | HEART RATE: 80 BPM | BODY MASS INDEX: 25.11 KG/M2 | SYSTOLIC BLOOD PRESSURE: 98 MMHG | DIASTOLIC BLOOD PRESSURE: 56 MMHG | HEIGHT: 67 IN | WEIGHT: 160 LBS | OXYGEN SATURATION: 99 %

## 2025-01-17 DIAGNOSIS — R41.840 DIFFICULTY CONCENTRATING: Primary | ICD-10-CM

## 2025-01-17 DIAGNOSIS — R41.89 BRAIN FOG: ICD-10-CM

## 2025-01-17 DIAGNOSIS — F90.0 ATTENTION DEFICIT HYPERACTIVITY DISORDER (ADHD), PREDOMINANTLY INATTENTIVE TYPE: ICD-10-CM

## 2025-01-17 NOTE — PROGRESS NOTES
HPI:     Patient returns for recheck of ADHD treatment.     Current Dose: Adderall 10 mg XR  Med compliance: no issue  Med Side effects: appetite changes, weight loss, fatigue, and headaches (but that got better)  Med Affordability: no issue  Home/school/work functioning: minimal improvement  Co-morbid psychological symptoms or diagnoses: anxiety, started therapy with Tracee  Other new medications: none  Other new hospitalizations / surgeries: non  Other health issues/concerns today:  none      Current Outpatient Medications   Medication Sig Dispense Refill    Meloxicam 15 MG Oral Tab 1 tab PO qDaily x14 days, then 1 tab PO qDaily PRN pain. Take with food. 30 tablet 0    amphetamine-dextroamphetamine ER (ADDERALL XR) 10 MG Oral Capsule SR 24 Hr Take 1 capsule (10 mg total) by mouth every morning. 30 capsule 0    Etonogestrel (NEXPLANON SC) Inject into the skin.        History reviewed. No pertinent past medical history.   History reviewed. No pertinent surgical history.   Family History   Problem Relation Age of Onset    Diabetes Father     Musculo-skelatal Disorder Mother         scoliosis    Musculo-skelatal Disorder Maternal Grandmother         scoliosis    Diabetes Paternal Grandfather     Diabetes Half-Brother       Social History     Socioeconomic History    Marital status: Single   Tobacco Use    Smoking status: Never    Smokeless tobacco: Never   Vaping Use    Vaping status: Former    Substances: THC, CBD    Devices: Pre-filled or refillable cartridge   Substance and Sexual Activity    Alcohol use: Yes     Alcohol/week: 3.0 standard drinks of alcohol     Types: 3 Glasses of wine per week     Comment: a week    Drug use: Yes     Types: Cannabis     Comment: occasionally    Sexual activity: Yes     Partners: Male   Other Topics Concern    Caffeine Concern Yes    Exercise Yes    Seat Belt Yes         REVIEW OF SYSTEMS:   Review of Systems   Constitutional:  Positive for appetite change, fatigue (from not eating  as much) and unexpected weight change. Negative for activity change and chills.   Respiratory:  Negative for cough and chest tightness.    Cardiovascular:  Negative for chest pain and palpitations.   Psychiatric/Behavioral:  Positive for decreased concentration. Negative for sleep disturbance (using weighted blanket that really helps) and suicidal ideas. The patient is nervous/anxious.        EXAM:   BP 98/56 (BP Location: Left arm, Patient Position: Sitting, Cuff Size: adult)   Pulse 80   Temp 97.5 °F (36.4 °C) (Temporal)   Ht 5' 7\" (1.702 m)   Wt 160 lb (72.6 kg)   LMP 10/09/2024 (Approximate)   SpO2 99%   BMI 25.06 kg/m²   Physical Exam  Constitutional:       General: She is not in acute distress.     Appearance: Normal appearance.   Cardiovascular:      Rate and Rhythm: Normal rate and regular rhythm.      Heart sounds: Normal heart sounds.   Pulmonary:      Effort: Pulmonary effort is normal.      Breath sounds: Normal breath sounds.   Neurological:      General: No focal deficit present.      Mental Status: She is alert.   Psychiatric:         Mood and Affect: Mood normal.         ASSESSMENT AND PLAN:   Diagnoses and all orders for this visit:    Difficulty concentrating    Brain fog    Attention deficit hyperactivity disorder (ADHD), predominantly inattentive type    Discussed options for ADHD management  Could consider trying Bupropion to help with anxiety and may mildly help with ADHD symptoms  She has taken Adderall 15 mg XR in past and feels like she would rather try that.   Due for repeat 2/1. Can increase  Focus on increasing protein and increased fluid intake

## 2025-02-04 ENCOUNTER — TELEPHONE (OUTPATIENT)
Dept: FAMILY MEDICINE CLINIC | Facility: CLINIC | Age: 25
End: 2025-02-04

## 2025-02-08 DIAGNOSIS — M79.18 CERVICAL MYOFASCIAL PAIN SYNDROME: ICD-10-CM

## 2025-02-10 RX ORDER — MELOXICAM 15 MG/1
TABLET ORAL
Qty: 30 TABLET | Refills: 0 | Status: SHIPPED | OUTPATIENT
Start: 2025-02-10

## 2025-02-10 NOTE — TELEPHONE ENCOUNTER
Refill Request    LAST GFR: 01/25/2024; 97. PROTOCOL FAILED d/t time elapsed.     Medication request: Meloxicam 15 MG Oral Tab. 1 tab PO qDaily x14 days, then 1 tab PO qDaily PRN pain. Take with food.     LOV: 1/10/2025 Jonny Hare,    Due back to clinic per last office note: Per Dr. Hare: \"F/u in 6-8 weeks PRN. \"  NOV: Visit date not found      [place]: 01[place]12/2025 #30    Urine drug screen (if applicable): n/a  Pain contract: n/a    LOV plan (if weaning or changing medications): Per Dr. Hare: \"Recommend meloxicam 15mg qDaily x14 days, then 15 mg daily on an as-needed basis.  Take with food, stop if gives side effects.\"

## 2025-03-03 ENCOUNTER — PATIENT OUTREACH (OUTPATIENT)
Dept: FAMILY MEDICINE CLINIC | Facility: CLINIC | Age: 25
End: 2025-03-03

## 2025-03-03 NOTE — PROGRESS NOTES
Patient due for Cardio Doppler.  MCM Sent, read by patient on 02/04/2025  Letter sent to patient  No future appointments.

## 2025-03-07 PROBLEM — M79.18 CERVICAL MYOFASCIAL PAIN SYNDROME: Status: ACTIVE | Noted: 2025-03-07

## 2025-03-07 PROBLEM — M41.9 SCOLIOSIS OF THORACIC SPINE: Status: ACTIVE | Noted: 2025-03-07

## 2025-04-06 DIAGNOSIS — F90.0 ATTENTION DEFICIT HYPERACTIVITY DISORDER (ADHD), PREDOMINANTLY INATTENTIVE TYPE: ICD-10-CM

## 2025-04-07 RX ORDER — DEXTROAMPHETAMINE SACCHARATE, AMPHETAMINE ASPARTATE, DEXTROAMPHETAMINE SULFATE AND AMPHETAMINE SULFATE 1.25; 1.25; 1.25; 1.25 MG/1; MG/1; MG/1; MG/1
TABLET ORAL
Qty: 30 TABLET | Refills: 0 | Status: SHIPPED | OUTPATIENT
Start: 2025-04-07

## 2025-04-07 RX ORDER — DEXTROAMPHETAMINE SACCHARATE, AMPHETAMINE ASPARTATE MONOHYDRATE, DEXTROAMPHETAMINE SULFATE AND AMPHETAMINE SULFATE 3.75; 3.75; 3.75; 3.75 MG/1; MG/1; MG/1; MG/1
15 CAPSULE, EXTENDED RELEASE ORAL EVERY MORNING
Qty: 30 CAPSULE | Refills: 0 | Status: SHIPPED | OUTPATIENT
Start: 2025-04-07 | End: 2025-05-07

## 2025-04-07 NOTE — TELEPHONE ENCOUNTER
Last office visit 3/7/25  Last refilled on 3/7/25 for # 30 with 0 refills  No future appointments.     Thank you.

## 2025-05-01 PROBLEM — N62 MACROMASTIA: Status: ACTIVE | Noted: 2025-05-01

## 2025-06-12 ENCOUNTER — TELEPHONE (OUTPATIENT)
Dept: FAMILY MEDICINE CLINIC | Facility: CLINIC | Age: 25
End: 2025-06-12

## 2025-06-12 NOTE — TELEPHONE ENCOUNTER
FYI  Symptoms started Tuesday morning when working out  Patient felt tingling sensation in right hand - started in index finger and radiates up arm to elbow  Having trouble with gripping with right hand - has been going on for a few months  No arm pain  No known injury to arm   Denies chest pain   Denies shortness of breath  No facial drooping  Patient will keep appointment  Patient advised to call with new symptoms      Future Appointments   Date Time Provider Department Center   6/18/2025  1:40 PM Darlene Galindo APRN [facility] [place]

## 2025-06-12 NOTE — TELEPHONE ENCOUNTER
Patient scheduled appointment on my chart for tingling in right hand and arm-noticed 6/3 during gym session and progressively worsening     Ok to wait till appointment   Sending to triage

## 2025-06-12 NOTE — TELEPHONE ENCOUNTER
Yes this is an ongoing issue for her. She could also follow up with physiatry or ortho spine, as this is likely related to her cervical spine/thoracic spine issues

## 2025-06-17 DIAGNOSIS — M54.2 NECK PAIN: Primary | ICD-10-CM

## 2025-06-19 ENCOUNTER — HOSPITAL ENCOUNTER (OUTPATIENT)
Dept: GENERAL RADIOLOGY | Age: 25
Discharge: HOME | End: 2025-06-19
Attending: STUDENT IN AN ORGANIZED HEALTH CARE EDUCATION/TRAINING PROGRAM
Payer: COMMERCIAL

## 2025-06-19 ENCOUNTER — OFFICE VISIT (OUTPATIENT)
Dept: ORTHOPEDICS CLINIC | Facility: CLINIC | Age: 25
End: 2025-06-19
Payer: COMMERCIAL

## 2025-06-19 DIAGNOSIS — M54.12 CERVICAL RADICULOPATHY: Primary | ICD-10-CM

## 2025-06-19 DIAGNOSIS — M54.2 NECK PAIN: ICD-10-CM

## 2025-06-19 PROCEDURE — 72050 X-RAY EXAM NECK SPINE 4/5VWS: CPT | Performed by: STUDENT IN AN ORGANIZED HEALTH CARE EDUCATION/TRAINING PROGRAM

## 2025-06-19 PROCEDURE — 99214 OFFICE O/P EST MOD 30 MIN: CPT | Performed by: STUDENT IN AN ORGANIZED HEALTH CARE EDUCATION/TRAINING PROGRAM

## 2025-06-19 RX ORDER — METHYLPREDNISOLONE 4 MG/1
TABLET ORAL
Qty: 21 TABLET | Refills: 0 | Status: SHIPPED | OUTPATIENT
Start: 2025-06-19

## 2025-06-19 NOTE — PROGRESS NOTES
[facility] - ORTHOPEDICS  [address] [place] [address], [address], [place] [address]  [address] [place] [id number]  [phone]     FOLLOW-UP PATIENT VISIT    Name: Aidee Ross   MRN: [id number]  Date: 6/19/2025     CC: neck and arm numbness      INTERVAL HISTORY:   Aidee Ross is a 25 year old female  follow-up patient whom I have been treating conservatively. Patient returns today for reevaluation of neck and arm pain/numbness.     Patient was last seen approximately 1 year ago with predominant neck pain.  Over the past 1 to 2 weeks, patient has noticed numbness in the right upper extremity associated with pain.  No weakness.    Bowel and bladder symptoms: absent.    The patient has not had issues with balance and/or hand dexterity problems such as changes in penmanship or the use of buttons or zippers.    ROS: No fever/chills or other constitutional issues.      PE:   There were no vitals filed for this visit.  Estimated body mass index is 25.25 kg/m² as calculated from the following:    Height as of 6/18/25: 5' 7\" (1.702 m).    Weight as of 6/18/25: 161 lb 3.2 oz (73.1 kg).    On physical examination, she is awake, alert and oriented x 3 and in no acute distress. Mood, affect and language are normal. She appears well developed and well nourished.  She walks without a nonantalgic, nonmyelopathic, non-Trendelenburg gait. Motor strength testing of the upper extremities shows 5/5 strength in bilateral deltoids, biceps, triceps, FDS, interosseus.   Sensation is intact to light touch C5-T1 distributions bilaterally. Reflexes normal. Negative Ramírez's bilaterally     Radiographic Examination/Diagnostics:  XR personally viewed, independently interpreted and radiology report was reviewed.  XR demonstrates on acute pathology, no significant degenerative changes      IMPRESSION: Aidee Ross is a 25 year old female with possible cervical radiculopathy    PLAN:   We had a  detailed discussion outlining the etiology, anatomy, pathophysiology, and natural history of cervical radiculopathy.  - Referred patient to PT and prescribed oral steroids    FOLLOW-UP:  We will see her back in follow-up in 6 weeks, or sooner if any problems arise. Patient understands and agrees with plan.    Norman Tran MD  Orthopedic Spine Surgeon  [facility]   [address] [place] [address].[email or web address]  t: [phone]   f: [phone]        This note was dictated using Dragon software.  While it was briefly proofread prior to completion, some grammatical, spelling, and word choice errors due to dictation may still occur.

## 2025-07-18 ENCOUNTER — TELEPHONE (OUTPATIENT)
Dept: FAMILY MEDICINE CLINIC | Facility: CLINIC | Age: 25
End: 2025-07-18

## 2025-07-18 DIAGNOSIS — F90.0 ATTENTION DEFICIT HYPERACTIVITY DISORDER (ADHD), PREDOMINANTLY INATTENTIVE TYPE: ICD-10-CM

## 2025-07-18 RX ORDER — DEXTROAMPHETAMINE SACCHARATE, AMPHETAMINE ASPARTATE, DEXTROAMPHETAMINE SULFATE AND AMPHETAMINE SULFATE 2.5; 2.5; 2.5; 2.5 MG/1; MG/1; MG/1; MG/1
10 TABLET ORAL 2 TIMES DAILY
Qty: 60 TABLET | Refills: 0 | Status: SHIPPED | OUTPATIENT
Start: 2025-07-18 | End: 2025-08-17

## 2025-07-18 NOTE — TELEPHONE ENCOUNTER
Last office visit 6/18/25  Last refilled  on 6/18/25 for # 60 with 0 refills  No future appointments.     Thank you.

## 2025-08-22 ENCOUNTER — PATIENT MESSAGE (OUTPATIENT)
Dept: FAMILY MEDICINE CLINIC | Facility: CLINIC | Age: 25
End: 2025-08-22

## 2025-08-22 DIAGNOSIS — F90.0 ATTENTION DEFICIT HYPERACTIVITY DISORDER (ADHD), PREDOMINANTLY INATTENTIVE TYPE: ICD-10-CM

## 2025-08-23 RX ORDER — DEXTROAMPHETAMINE SACCHARATE, AMPHETAMINE ASPARTATE, DEXTROAMPHETAMINE SULFATE AND AMPHETAMINE SULFATE 2.5; 2.5; 2.5; 2.5 MG/1; MG/1; MG/1; MG/1
10 TABLET ORAL 2 TIMES DAILY
Qty: 60 TABLET | Refills: 0 | Status: SHIPPED | OUTPATIENT
Start: 2025-08-23 | End: 2025-09-22

## (undated) NOTE — LETTER
03/03/25        Aidee Ross  [address] [address]        Dear Aidee Ross    Our records indicate that you have outstanding lab work and/or testing that was ordered for you and has not yet been completed:    Lab Frequency Next Occurrence   CARD ECHO 2D DOPPLER (CPT=93306) Once 12/05/2024     To provide you with the best possible care, please complete these orders at your earliest convenience. If you have recently completed these orders please disregard this letter.      To schedule imaging, or outpatient tests please call Central Scheduling at [phone].      For any blood work needed, you can get this done at the Reference Lab in our [place] office anytime Monday-Friday from 7:30am-4:00pm and you do not need an appointment. They are closed for lunch between 12-1pm. They are closed Saturday and Sunday. If you need a time outside of these hours please call us to schedule an appointment.    *If you prefer to use [facility] for your labs please let us know so we can fax your orders.    Thank you,    [facility]

## (undated) NOTE — LETTER
Aidee Ross, :2000    CONSENT FOR PROCEDURE/SEDATION    1. I authorize the performance upon Aidee Ross  the following: Nexplanon Insertion    2. I authorize XIANG Bonilla (and whomever is designated as the doctor’s assistant), to perform the above-mentioned procedures.    3. If any unforeseen conditions arise during this procedure calling for additional  procedures, operations, or medications (including anesthesia and blood transfusion), I further request and authorize the doctor to do whatever he/she deems advisable in my interest.    4. I consent to the taking and reproduction of any photographs in the course of this procedure for professional purposes.    5. I consent to the administration of such sedation as may be considered necessary or advisable by the physician responsible for this service, with the exception of ______________________________________________________    6. I have been informed by my doctor of the nature and purpose of this procedure sedation, possible alternative methods of treatment, risk involved and possible complications.    7. If I have a Do Not Resuscitate (DNR) order in place, the physician and I (or the individual authorized to consent on my behalf) will discuss and agree as to whether the Do Not Resuscitate (DNR) order will remain in effect or will be discontinued during the performance of the procedure and the applicable recovery period. If the Do Not Resuscitate (DNR) order is discontinued and is to be reinstated following the procedure/recovery period, the physician will determine when the applicable recovery period ends for purposes of reinstating the Do Not Resuscitate (DNR) order.    Signature of Patient:_______________________________________________    Signature of person authorized to consent for patient:  _______________________________________________________________    Relationship to patient:  ____________________________________________    Witness: _________________________________________ Date:___________     Physician Signature: _______________________________ Date:___________

## (undated) NOTE — LETTER
Aidee Ross, :2000    CONSENT FOR PROCEDURE/SEDATION    1. I authorize the performance upon Aidee Ross  the following: Removal only Nexplanon    2. I authorize XIANG Bonilla (and whomever is designated as the doctor’s assistant), to perform the above-mentioned procedures.    3. If any unforeseen conditions arise during this procedure calling for additional  procedures, operations, or medications (including anesthesia and blood transfusion), I further request and authorize the doctor to do whatever he/she deems advisable in my interest.    4. I consent to the taking and reproduction of any photographs in the course of this procedure for professional purposes.    5. I consent to the administration of such sedation as may be considered necessary or advisable by the physician responsible for this service, with the exception of ______________________________________________________    6. I have been informed by my doctor of the nature and purpose of this procedure sedation, possible alternative methods of treatment, risk involved and possible complications.    7. If I have a Do Not Resuscitate (DNR) order in place, the physician and I (or the individual authorized to consent on my behalf) will discuss and agree as to whether the Do Not Resuscitate (DNR) order will remain in effect or will be discontinued during the performance of the procedure and the applicable recovery period. If the Do Not Resuscitate (DNR) order is discontinued and is to be reinstated following the procedure/recovery period, the physician will determine when the applicable recovery period ends for purposes of reinstating the Do Not Resuscitate (DNR) order.    Signature of Patient:_______________________________________________    Signature of person authorized to consent for patient:  _______________________________________________________________    Relationship to patient:  ____________________________________________    Witness: _________________________________________ Date:___________     Physician Signature: _______________________________ Date:___________